# Patient Record
Sex: FEMALE | Race: ASIAN | NOT HISPANIC OR LATINO | URBAN - METROPOLITAN AREA
[De-identification: names, ages, dates, MRNs, and addresses within clinical notes are randomized per-mention and may not be internally consistent; named-entity substitution may affect disease eponyms.]

---

## 2018-01-28 ENCOUNTER — EMERGENCY (EMERGENCY)
Facility: HOSPITAL | Age: 43
LOS: 1 days | Discharge: HOME | End: 2018-01-28
Attending: EMERGENCY MEDICINE | Admitting: EMERGENCY MEDICINE
Payer: MEDICAID

## 2018-01-28 VITALS
OXYGEN SATURATION: 100 % | RESPIRATION RATE: 16 BRPM | DIASTOLIC BLOOD PRESSURE: 50 MMHG | TEMPERATURE: 98 F | HEART RATE: 81 BPM | SYSTOLIC BLOOD PRESSURE: 111 MMHG

## 2018-01-28 PROCEDURE — 99283 EMERGENCY DEPT VISIT LOW MDM: CPT

## 2018-01-28 RX ORDER — IBUPROFEN 200 MG
600 TABLET ORAL ONCE
Qty: 0 | Refills: 0 | Status: COMPLETED | OUTPATIENT
Start: 2018-01-28 | End: 2018-01-28

## 2018-01-28 RX ORDER — OXYCODONE HYDROCHLORIDE 5 MG/1
1 TABLET ORAL
Qty: 12 | Refills: 0 | OUTPATIENT
Start: 2018-01-28 | End: 2018-01-30

## 2018-01-28 RX ORDER — IBUPROFEN 200 MG
1 TABLET ORAL
Qty: 16 | Refills: 0 | OUTPATIENT
Start: 2018-01-28 | End: 2018-01-31

## 2018-01-28 RX ORDER — AMOXICILLIN 250 MG/5ML
1 SUSPENSION, RECONSTITUTED, ORAL (ML) ORAL
Qty: 21 | Refills: 0 | OUTPATIENT
Start: 2018-01-28 | End: 2018-02-03

## 2018-01-28 RX ORDER — OXYCODONE HYDROCHLORIDE 5 MG/1
5 TABLET ORAL ONCE
Qty: 0 | Refills: 0 | Status: DISCONTINUED | OUTPATIENT
Start: 2018-01-28 | End: 2018-01-28

## 2018-01-28 RX ORDER — AMOXICILLIN 250 MG/5ML
500 SUSPENSION, RECONSTITUTED, ORAL (ML) ORAL ONCE
Qty: 0 | Refills: 0 | Status: COMPLETED | OUTPATIENT
Start: 2018-01-28 | End: 2018-01-28

## 2018-01-28 RX ADMIN — Medication 600 MILLIGRAM(S): at 17:34

## 2018-01-28 RX ADMIN — Medication 500 MILLIGRAM(S): at 17:33

## 2018-01-28 RX ADMIN — OXYCODONE HYDROCHLORIDE 5 MILLIGRAM(S): 5 TABLET ORAL at 17:34

## 2018-01-28 NOTE — ED PROVIDER NOTE - RESPIRATORY, MLM
Answering service of Dr George notified; Dr Amato on call. Waiting for call back.    Breath sounds clear and equal bilaterally.

## 2018-01-28 NOTE — ED PROVIDER NOTE - CARE PLAN
Principal Discharge DX:	Tooth ache Principal Discharge DX:	Tooth ache  Assessment and plan of treatment:	Take Amoxicillin 500mg three times a day for 7 days.  Percocet 1 tablet every 6 hrs as needed for breakthrough discomfort- caution drowsiness while taking this medication- do not drive or operate heavy machinery. Take Ibuprofen 600mg every 6 hours as needed for mild-moderate pain. Follow up in the Dental clinic tomorrow morning [phone]. Return to the Emergency Department for any new, worsening or concerning symptoms.

## 2018-01-28 NOTE — ED PROVIDER NOTE - OBJECTIVE STATEMENT
43 y/o F w/ no significant PMHx presents to ED c/o x1day of rt upper dental pain. States she had a filling placed last year. Yesterday when biting into something hard she felt a piece of her filling come out. +bleeding noted w/ worsening pain. Has been taking Tylenol for sx with no relief. Denies other complaints. NKDA. 43 y/o F w/ no significant PMHx presents to ED c/o x1day of rt upper dental pain. States she had a filling placed last year. Yesterday when biting into something hard she felt a piece of her filling come out. +bleeding noted w/ worsening pain. Has been taking Tylenol for sx with no relief. No associated fevers or chills. Denies other complaints. NKDA.

## 2018-01-28 NOTE — ED PROVIDER NOTE - TOOTH FINDINGS
missing filling in tooth #4 no obvious periapical abscess, tooth and surrounding gumline is tender but no fluctuance, no facial swelling, no mastoid tenderness missing filling in tooth #4 no obvious periapical abscess, tooth and surrounding gumline is tender but no fluctuance, no active bleeding, no facial swelling, no mastoid tenderness

## 2018-01-28 NOTE — ED PROVIDER NOTE - MEDICAL DECISION MAKING DETAILS
43 y/o F w/ dental pain and missing filling at tooth #4. Plan - pain control, antibiotics, d/c and f/u w/ dental tomorrow.

## 2018-01-28 NOTE — ED PROVIDER NOTE - PLAN OF CARE
Take Amoxicillin 500mg three times a day for 7 days.  Percocet 1 tablet every 6 hrs as needed for breakthrough discomfort- caution drowsiness while taking this medication- do not drive or operate heavy machinery. Take Ibuprofen 600mg every 6 hours as needed for mild-moderate pain. Follow up in the Dental clinic tomorrow morning [phone]. Return to the Emergency Department for any new, worsening or concerning symptoms.

## 2022-10-10 NOTE — ED ADULT TRIAGE NOTE - TEMPERATURE IN FAHRENHEIT (DEGREES F)
Jackie Vásquez was seen and treated in our emergency department on 10/10/2022. She may return to work on 10/12/2022. If you have any questions or concerns, please don't hesitate to call.       Ruth Arango, DO 98.1